# Patient Record
Sex: FEMALE | Race: WHITE | URBAN - METROPOLITAN AREA
[De-identification: names, ages, dates, MRNs, and addresses within clinical notes are randomized per-mention and may not be internally consistent; named-entity substitution may affect disease eponyms.]

---

## 2019-08-11 ENCOUNTER — OFFICE VISIT (OUTPATIENT)
Dept: ORTHOPEDICS | Facility: CLINIC | Age: 29
End: 2019-08-11
Payer: COMMERCIAL

## 2019-08-11 VITALS — BODY MASS INDEX: 25.76 KG/M2 | HEIGHT: 62 IN | WEIGHT: 140 LBS

## 2019-08-11 DIAGNOSIS — G57.01 PIRIFORMIS SYNDROME OF RIGHT SIDE: Primary | ICD-10-CM

## 2019-08-11 RX ORDER — CETIRIZINE HYDROCHLORIDE 10 MG/1
10 TABLET ORAL
COMMUNITY
Start: 2017-12-22

## 2019-08-11 ASSESSMENT — MIFFLIN-ST. JEOR: SCORE: 1303.29

## 2019-08-11 NOTE — LETTER
8/11/2019       RE: Saba James  [address]  [place] [address]     Dear Colleague,    Thank you for referring your patient, Saba James, to the [facility] SPORTS AND ORTHOPAEDIC WALK IN CLINIC at [facility]. Please see a copy of my visit note below.          SPORTS & ORTHOPEDIC WALK-IN VISIT 8/11/2019    Primary Care Physician: Dr. Louis    Started triathlon training 6/19 had a very hard training session. Tapered down her  training, last week ran 5 mile race and had minimal, consistent pain in her hip.    Just completed a triathlon today!    Initially has pain in the area of her TFL that spreads into her glute med muscle.  Now has pain that travels posteriorly from glutes into calves (some into foot today).    Reason for visit:     What part of your body is injured / painful?  right hip    What caused the injury /pain? Recreational/competitive sports injury - Other:triathlon    How long ago did your injury occur or pain begin? several months ago early June    What are your most bothersome symptoms? Pain, Numbness and Tingling (brought on today)    How would you characterize your symptom?  dull and sharp    What makes your symptoms better? Rest    What makes your symptoms worse? Other: notices when walking, but running is worse    Have you been previously seen for this problem? No    Medical History:    Any recent changes to your medical history? No    Any new medication prescribed since last visit? No    Have you had surgery on this body part before? 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 No    Do you have any vision problems? No    Do you have any chest pain or edema? No    Do you have any shortness of breath or wheezing?  No    Do you have stomach problems? No    Do you have any numbness or focal weakness? No    Do  "you have diabetes? No    Do you have problems with bleeding or clotting? No    Do you have an rashes or other skin lesions? No       CHIEF COMPLAINT:  Pain of the Right Hip       HISTORY OF PRESENT ILLNESS  Ms. James is a pleasant 29 year old year old female who presents to clinic today with right buttocks pain.  Saba explains that her symptoms have been ongoing for several months while training for triathlon.  Today, the patient successfully completed the triathlon but unfortunately, experience worsening right buttock pain.  She describes the pain is a dull, stabbing pain in her right buttock with occasional radiation of pain down the posterior aspect of her right leg.  She reports that her symptoms are exacerbated with running.  To date, the patient has attempted using conservative measures such as rest and acetaminophen, both of which have been mildly effective.  Following the race, the patient has done limited ambulation and reports an improvement in her pain.    Additional history: as documented    MEDICAL HISTORY  There is no problem list on file for this patient.      Current Outpatient Medications   Medication Sig Dispense Refill     cetirizine (ZYRTEC) 10 MG tablet Take 10 mg by mouth         No Known Allergies    Family history was reviewed and is determined to be noncontributory    Additional medical/Social/Surgical histories reviewed in [employer] and updated as appropriate.     REVIEW OF SYSTEMS (8/11/2019)  A 10-point review of systems was obtained and is negative except for as noted in the HPI.      PHYSICAL EXAM  Ht 1.575 m (5' 2\")   Wt 63.5 kg (140 lb)   BMI 25.61 kg/m         General  - normal appearance, in no obvious distress  CV  - normal peripheral perfusion  Pulm  - normal respiratory pattern, non-labored  Musculoskeletal - low back and hip  - stance: normal gait without limp, no obvious leg length discrepancy, normal heel and toe walk  - inspection: normal bone and joint alignment, no " obvious scoliosis  - palpation: no paravertebral or bony tenderness; tenderness with palpation over the right gluteus deepali, medius  - ROM: Active flexion and extension of hip without tenderness; abduction and abduction of hip without tenderness; no tenderness with passive internal and external rotation of the hip; pinpoint tenderness of the right gluteus with flexion abduction and internal rotation; pinpoint tenderness of the right gluteus with passive maximum flexion of hip and knee extension  - strength: lower extremities 5/5 in all planes  - special tests:  (-) straight leg raise  Neuro  - patellar and Achilles DTRs 2+ bilaterally, lower extremity sensory deficit throughout L5 distribution, grossly normal coordination, normal muscle tone  Skin  - no ecchymosis, erythema, warmth, or induration, no obvious rash  Psych  - interactive, appropriate, normal mood and affect    IMAGING : No imaging indicated    ASSESSMENT & PLAN  Ms. James is a 29 year old year old female who presents to clinic today with right buttock pain likely secondary to piriformis syndrome.    Saba presents to clinic after completing a triathlon earlier today.  Although her symptoms of pinpoint gluteal pain have been ongoing for several months, she reports an increase in symptoms and associated radiating pain following today's event.  Based on her history and physical exam, her diagnosis is most likely consistent with piriformis syndrome.  We discussed the importance of the.  Of rest, physical therapy, ice, and NSAIDs for symptom relief.  We supplied her with a collection of exercises that she can use to improve the strength of her abductors.    In some rare instances, the pathology can manifest as buttock pain (labral tear, cam or pincer pathology).  Although this is likely not the case given the patient's presenting symptoms, I instructed her on the importance of advocating for follow-up AP pelvis imaging if her symptoms do not continue  to improve with the conservative measures discussed today.  She understands and agrees with this plan    It was a pleasure seeing Saba today.    Festus Luacs PA-C 8/11/2019 3:06 PM  Orthopaedic Surgery    Please page me at [phone] with any questions/concerns. If there is no response, if it is a weekend, or if it is during evening hours, please page the orthopaedic surgery resident on call.

## 2019-08-11 NOTE — LETTER
8/11/2019       RE: Saba James  [address]  [place] [address]     Dear Colleague,    Thank you for referring your patient, Saba James, to the [facility] SPORTS AND ORTHOPAEDIC WALK IN CLINIC at [facility]. Please see a copy of my visit note below.          SPORTS & ORTHOPEDIC WALK-IN VISIT 8/11/2019    Primary Care Physician: Dr. Louis    Started triathlon training 6/19 had a very hard training session. Tapered down her training, last week ran 5 mile race and had minimal, consistent pain in her hip.    Just completed a triathlon today!    Initially has pain in the area of her TFL that spreads into her glute med muscle.  Now has pain that travels posteriorly from glutes into calves (some into foot today).    Reason for visit:     What part of your body is injured / painful?  right hip    What caused the injury /pain? Recreational/competitive sports injury - Other:triathlon    How long ago did your injury occur or pain begin? several months ago early June    What are your most bothersome symptoms? Pain, Numbness and Tingling (brought on today)    How would you characterize your symptom?  dull and sharp    What makes your symptoms better? Rest    What makes your symptoms worse? Other: notices when walking, but running is worse    Have you been previously seen for this problem? No    Medical History:    Any recent changes to your medical history? No    Any new medication prescribed since last visit? No    Have you had surgery on this body part before? 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 No    Do you have any vision problems? No    Do you have any chest pain or edema? No    Do you have any shortness of breath or wheezing?  No    Do you have stomach problems? No    Do you have any numbness or focal weakness? No    Do  "you have diabetes? No    Do you have problems with bleeding or clotting? No    Do you have an rashes or other skin lesions? No       CHIEF COMPLAINT:  Pain of the Right Hip       HISTORY OF PRESENT ILLNESS  Ms. James is a pleasant 29 year old year old female who presents to clinic today with right buttocks pain.  Saba explains that her symptoms have been ongoing for several months while training for triathlon.  Today, the patient successfully completed the triathlon but unfortunately, experience worsening right buttock pain.  She describes the pain is a dull, stabbing pain in her right buttock with occasional radiation of pain down the posterior aspect of her right leg.  She reports that her symptoms are exacerbated with running.  To date, the patient has attempted using conservative measures such as rest and acetaminophen, both of which have been mildly effective.  Following the race, the patient has done limited ambulation and reports an improvement in her pain.    Additional history: as documented    MEDICAL HISTORY  There is no problem list on file for this patient.      Current Outpatient Medications   Medication Sig Dispense Refill     cetirizine (ZYRTEC) 10 MG tablet Take 10 mg by mouth         No Known Allergies    Family history was reviewed and is determined to be noncontributory    Additional medical/Social/Surgical histories reviewed in [employer] and updated as appropriate.     REVIEW OF SYSTEMS (8/11/2019)  A 10-point review of systems was obtained and is negative except for as noted in the HPI.      PHYSICAL EXAM  Ht 1.575 m (5' 2\")   Wt 63.5 kg (140 lb)   BMI 25.61 kg/m         General  - normal appearance, in no obvious distress  CV  - normal peripheral perfusion  Pulm  - normal respiratory pattern, non-labored  Musculoskeletal - low back and hip  - stance: normal gait without limp, no obvious leg length discrepancy, normal heel and toe walk  - inspection: normal bone and joint alignment, no " obvious scoliosis  - palpation: no paravertebral or bony tenderness; tenderness with palpation over the right gluteus deepali, medius  - ROM: Active flexion and extension of hip without tenderness; abduction and abduction of hip without tenderness; no tenderness with passive internal and external rotation of the hip; pinpoint tenderness of the right gluteus with flexion abduction and internal rotation; pinpoint tenderness of the right gluteus with passive maximum flexion of hip and knee extension  - strength: lower extremities 5/5 in all planes  - special tests:  (-) straight leg raise  Neuro  - patellar and Achilles DTRs 2+ bilaterally, lower extremity sensory deficit throughout L5 distribution, grossly normal coordination, normal muscle tone  Skin  - no ecchymosis, erythema, warmth, or induration, no obvious rash  Psych  - interactive, appropriate, normal mood and affect    IMAGING : No imaging indicated    ASSESSMENT & PLAN  Ms. James is a 29 year old year old female who presents to clinic today with right buttock pain likely secondary to piriformis syndrome.    Saba presents to clinic after completing a triathlon earlier today.  Although her symptoms of pinpoint gluteal pain have been ongoing for several months, she reports an increase in symptoms and associated radiating pain following today's event.  Based on her history and physical exam, her diagnosis is most likely consistent with piriformis syndrome.  We discussed the importance of the.  Of rest, physical therapy, ice, and NSAIDs for symptom relief.  We supplied her with a collection of exercises that she can use to improve the strength of her abductors.    In some rare instances, the pathology can manifest as buttock pain (labral tear, cam or pincer pathology).  Although this is likely not the case given the patient's presenting symptoms, I instructed her on the importance of advocating for follow-up AP pelvis imaging if her symptoms do not continue  to improve with the conservative measures discussed today.  She understands and agrees with this plan    It was a pleasure seeing Saba today.    Festus Lucas PA-C 8/11/2019 3:06 PM  Orthopaedic Surgery    Please page me at [phone] with any questions/concerns. If there is no response, if it is a weekend, or if it is during evening hours, please page the orthopaedic surgery resident on call.      Again, thank you for allowing me to participate in the care of your patient.      Sincerely,    Festus Lucas PA-C

## 2019-08-11 NOTE — PROGRESS NOTES
SPORTS & ORTHOPEDIC WALK-IN VISIT 8/11/2019    Primary Care Physician: Dr. Lousi    Started triathlon training 6/19 had a very hard training session. Tapered down her training, last week ran 5 mile race and had minimal, consistent pain in her hip.    Just completed a triathlon today!    Initially has pain in the area of her TFL that spreads into her glute med muscle.  Now has pain that travels posteriorly from glutes into calves (some into foot today).    Reason for visit:     What part of your body is injured / painful?  right hip    What caused the injury /pain? Recreational/competitive sports injury - Other:triathlon    How long ago did your injury occur or pain begin? several months ago early June    What are your most bothersome symptoms? Pain, Numbness and Tingling (brought on today)    How would you characterize your symptom?  dull and sharp    What makes your symptoms better? Rest    What makes your symptoms worse? Other: notices when walking, but running is worse    Have you been previously seen for this problem? No    Medical History:    Any recent changes to your medical history? No    Any new medication prescribed since last visit? No    Have you had surgery on this body part before? 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 No    Do you have any vision problems? No    Do you have any chest pain or edema? No    Do you have any shortness of breath or wheezing?  No    Do you have stomach problems? No    Do you have any numbness or focal weakness? No    Do you have diabetes? No    Do you have problems with bleeding or clotting? No    Do you have an rashes or other skin lesions? No       CHIEF COMPLAINT:  Pain of the Right Hip       HISTORY OF PRESENT ILLNESS  Ms. James is a pleasant 29 year old year old female who presents to clinic today with right  "buttocks pain.  Saba explains that her symptoms have been ongoing for several months while training for triathlon.  Today, the patient successfully completed the triathlon but unfortunately, experience worsening right buttock pain.  She describes the pain is a dull, stabbing pain in her right buttock with occasional radiation of pain down the posterior aspect of her right leg.  She reports that her symptoms are exacerbated with running.  To date, the patient has attempted using conservative measures such as rest and acetaminophen, both of which have been mildly effective.  Following the race, the patient has done limited ambulation and reports an improvement in her pain.    Additional history: as documented    MEDICAL HISTORY  There is no problem list on file for this patient.      Current Outpatient Medications   Medication Sig Dispense Refill     cetirizine (ZYRTEC) 10 MG tablet Take 10 mg by mouth         No Known Allergies    Family history was reviewed and is determined to be noncontributory    Additional medical/Social/Surgical histories reviewed in [facility] and updated as appropriate.     REVIEW OF SYSTEMS (8/11/2019)  A 10-point review of systems was obtained and is negative except for as noted in the HPI.      PHYSICAL EXAM  Ht 1.575 m (5' 2\")   Wt 63.5 kg (140 lb)   BMI 25.61 kg/m        General  - normal appearance, in no obvious distress  CV  - normal peripheral perfusion  Pulm  - normal respiratory pattern, non-labored  Musculoskeletal - low back and hip  - stance: normal gait without limp, no obvious leg length discrepancy, normal heel and toe walk  - inspection: normal bone and joint alignment, no obvious scoliosis  - palpation: no paravertebral or bony tenderness; tenderness with palpation over the right gluteus deepali, medius  - ROM: Active flexion and extension of hip without tenderness; abduction and abduction of hip without tenderness; no tenderness with passive internal and external rotation " of the hip; pinpoint tenderness of the right gluteus with flexion abduction and internal rotation; pinpoint tenderness of the right gluteus with passive maximum flexion of hip and knee extension  - strength: lower extremities 5/5 in all planes  - special tests:  (-) straight leg raise  Neuro  - patellar and Achilles DTRs 2+ bilaterally, lower extremity sensory deficit throughout L5 distribution, grossly normal coordination, normal muscle tone  Skin  - no ecchymosis, erythema, warmth, or induration, no obvious rash  Psych  - interactive, appropriate, normal mood and affect    IMAGING : No imaging indicated    ASSESSMENT & PLAN  Ms. James is a 29 year old year old female who presents to clinic today with right buttock pain likely secondary to piriformis syndrome.    Saba presents to clinic after completing a triathlon earlier today.  Although her symptoms of pinpoint gluteal pain have been ongoing for several months, she reports an increase in symptoms and associated radiating pain following today's event.  Based on her history and physical exam, her diagnosis is most likely consistent with piriformis syndrome.  We discussed the importance of the.  Of rest, physical therapy, ice, and NSAIDs for symptom relief.  We supplied her with a collection of exercises that she can use to improve the strength of her abductors.    In some rare instances, the pathology can manifest as buttock pain (labral tear, cam or pincer pathology).  Although this is likely not the case given the patient's presenting symptoms, I instructed her on the importance of advocating for follow-up AP pelvis imaging if her symptoms do not continue to improve with the conservative measures discussed today.  She understands and agrees with this plan    It was a pleasure seeing Saba today.    Festus Lucas PA-C 8/11/2019 3:06 PM  Orthopaedic Surgery    Please page me at [phone] with any questions/concerns. If there is no response, if it is a  weekend, or if it is during evening hours, please page the orthopaedic surgery resident on call.

## 2022-11-23 ENCOUNTER — APPOINTMENT (OUTPATIENT)
Dept: URBAN - METROPOLITAN AREA CLINIC 314 | Age: 32
Setting detail: DERMATOLOGY
End: 2022-11-28

## 2022-11-23 DIAGNOSIS — Z12.83 ENCOUNTER FOR SCREENING FOR MALIGNANT NEOPLASM OF SKIN: ICD-10-CM

## 2022-11-23 DIAGNOSIS — D22 MELANOCYTIC NEVI: ICD-10-CM

## 2022-11-23 DIAGNOSIS — L81.4 OTHER MELANIN HYPERPIGMENTATION: ICD-10-CM

## 2022-11-23 PROBLEM — D22.5 MELANOCYTIC NEVI OF TRUNK: Status: ACTIVE | Noted: 2022-11-23

## 2022-11-23 PROCEDURE — 99203 OFFICE O/P NEW LOW 30 MIN: CPT

## 2022-11-23 PROCEDURE — OTHER MIPS QUALITY: OTHER

## 2022-11-23 PROCEDURE — OTHER COUNSELING: OTHER

## 2022-11-23 ASSESSMENT — LOCATION ZONE DERM
LOCATION ZONE: ARM
LOCATION ZONE: TRUNK

## 2022-11-23 ASSESSMENT — LOCATION SIMPLE DESCRIPTION DERM
LOCATION SIMPLE: RIGHT SHOULDER
LOCATION SIMPLE: LEFT SHOULDER
LOCATION SIMPLE: LOWER BACK

## 2022-11-23 ASSESSMENT — LOCATION DETAILED DESCRIPTION DERM
LOCATION DETAILED: LEFT POSTERIOR SHOULDER
LOCATION DETAILED: RIGHT ANTERIOR SHOULDER
LOCATION DETAILED: SUPERIOR LUMBAR SPINE
LOCATION DETAILED: RIGHT POSTERIOR SHOULDER
LOCATION DETAILED: LEFT ANTERIOR SHOULDER

## 2024-01-24 ENCOUNTER — APPOINTMENT (OUTPATIENT)
Dept: URBAN - METROPOLITAN AREA CLINIC 314 | Age: 34
Setting detail: DERMATOLOGY
End: 2024-01-24

## 2024-01-24 DIAGNOSIS — D18.0 HEMANGIOMA: ICD-10-CM

## 2024-01-24 PROBLEM — D18.01 HEMANGIOMA OF SKIN AND SUBCUTANEOUS TISSUE: Status: ACTIVE | Noted: 2024-01-24

## 2024-01-24 PROCEDURE — 99212 OFFICE O/P EST SF 10 MIN: CPT

## 2024-01-24 PROCEDURE — OTHER ADDITIONAL NOTES: OTHER

## 2024-01-24 PROCEDURE — OTHER COUNSELING: OTHER

## 2024-01-24 ASSESSMENT — LOCATION SIMPLE DESCRIPTION DERM: LOCATION SIMPLE: CHEST

## 2024-01-24 ASSESSMENT — LOCATION ZONE DERM: LOCATION ZONE: TRUNK

## 2024-01-24 ASSESSMENT — LOCATION DETAILED DESCRIPTION DERM: LOCATION DETAILED: UPPER STERNUM

## 2024-01-24 NOTE — PROCEDURE: ADDITIONAL NOTES
Render Risk Assessment In Note?: no
Detail Level: Simple
Additional Notes: Discussed nature of lesion. No concerning features but if bothersome (traumatized, irritated) could shave or laser. May be too large to electrodesiccate. Not bothering her at this time.